# Patient Record
Sex: FEMALE | Race: WHITE | NOT HISPANIC OR LATINO | Employment: FULL TIME | URBAN - METROPOLITAN AREA
[De-identification: names, ages, dates, MRNs, and addresses within clinical notes are randomized per-mention and may not be internally consistent; named-entity substitution may affect disease eponyms.]

---

## 2017-04-28 ENCOUNTER — HOSPITAL ENCOUNTER (OUTPATIENT)
Dept: CARDIOLOGY | Facility: HOSPITAL | Age: 67
Discharge: HOME | End: 2017-04-28
Attending: INTERNAL MEDICINE | Admitting: INTERNAL MEDICINE

## 2021-03-15 ENCOUNTER — OFFICE VISIT (OUTPATIENT)
Dept: CARDIOLOGY | Facility: CLINIC | Age: 71
End: 2021-03-15

## 2021-03-15 DIAGNOSIS — Z79.02 LONG TERM CURRENT USE OF ANTITHROMBOTICS/ANTIPLATELETS: Primary | ICD-10-CM

## 2021-03-15 DIAGNOSIS — E78.2 MIXED HYPERLIPIDEMIA: ICD-10-CM

## 2021-03-15 DIAGNOSIS — I10 ESSENTIAL HYPERTENSION: ICD-10-CM

## 2021-03-15 DIAGNOSIS — E66.01 MORBIDLY OBESE (HCC): ICD-10-CM

## 2021-03-15 DIAGNOSIS — I42.5 CONSTRICTIVE CARDIOMYOPATHY (HCC): ICD-10-CM

## 2021-03-15 PROBLEM — E78.5 HYPERLIPIDEMIA: Status: ACTIVE | Noted: 2018-08-22

## 2021-03-15 PROCEDURE — 93000 ELECTROCARDIOGRAM COMPLETE: CPT | Performed by: NURSE PRACTITIONER

## 2021-03-15 PROCEDURE — 99213 OFFICE O/P EST LOW 20 MIN: CPT | Performed by: NURSE PRACTITIONER

## 2021-03-15 RX ORDER — POTASSIUM CHLORIDE 750 MG/1
10 TABLET, FILM COATED, EXTENDED RELEASE ORAL DAILY
COMMUNITY
End: 2022-04-14 | Stop reason: ALTCHOICE

## 2021-03-15 RX ORDER — CARVEDILOL 3.12 MG/1
TABLET ORAL
COMMUNITY
Start: 2021-01-14

## 2021-03-15 RX ORDER — ALPRAZOLAM 0.5 MG/1
TABLET ORAL AS NEEDED
COMMUNITY

## 2021-03-15 RX ORDER — AMLODIPINE BESYLATE 5 MG/1
5 TABLET ORAL DAILY
Qty: 30 TABLET | Refills: 5 | Status: SHIPPED | OUTPATIENT
Start: 2021-03-15

## 2021-03-15 RX ORDER — FUROSEMIDE 40 MG/1
TABLET ORAL
COMMUNITY
Start: 2021-01-14

## 2021-03-15 RX ORDER — SULINDAC 200 MG/1
TABLET ORAL 2 TIMES DAILY
COMMUNITY
Start: 2021-01-14

## 2021-03-15 RX ORDER — LOSARTAN POTASSIUM 100 MG/1
TABLET ORAL DAILY
COMMUNITY
Start: 2021-03-04

## 2021-03-15 RX ORDER — ATORVASTATIN CALCIUM 20 MG/1
TABLET, FILM COATED ORAL
COMMUNITY
Start: 2021-01-14

## 2021-03-15 NOTE — PROGRESS NOTES
[facility] CARDIOLOGY      REASON FOR FOLLOW-UP:  CDL renewal  Hypertension with hypertensive cardiovascular disease  Dyslipidemia  Nonischemic cardiomyopathy  Improvement in LV systolic function  History of congestive heart failure with reduced ejection fraction        Chief Complaint   Patient presents with   • Cardiomyopathy     2 year f/u-needs CDL license renewed.  No cardiac complaints.   • Hypertension         Dear Brian Wheeler MD        History of Present Illness     It was my pleasure to see Ms. Farrell in the office today.  As you are aware, she is a 70 year-old  female with no known history of occlusive coronary disease.  She does however have a history of nonischemic cardiomyopathy with echocardiogram 8/5/2016 showing moderate-severely reduced LV systolic function and EF 35-40%.  Follow-up echo 5/2/2017 with significant improvement in overall LV systolic function and EF 55-60%, grade 1 diastolic dysfunction-impaired LV relaxation, mild concentric LVH.  She has additional history that includes hypertension with hypertensive cardiovascular disease, dyslipidemia, congestive heart failure.  Patient's last office visit was February 2019.  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    Today, the patient reports that she feels well.  She specifically denies any complaints of chest pains, pressure, tightness or palpitations.  She denies any shortness of breath at rest, dyspnea with exertion, orthopnea or PND.  No dizziness, lightheadedness, lower extremity edema or claudication.  She stated her blood pressure medication is being adjusted for uncontrolled hypertension.  Blood pressure in the office today is elevated at 160/82.  EKG shows normal sinus rhythm with rate of 59.       Assessment:  Nonischemic cardiomyopathy with most recent ejection fraction improved from 35-40% to 55-60%.  History of HFrEF.      Echo 5/17 with normal LV systolic  function  Hypertension  Dyslipidemia.  Antiplatelet therapy.        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  No room to titrate beta-blocker due to heart rate.  Patient on high dose of losartan.  We will add amlodipine 5 mg once daily  Follow-up in 12 months time or sooner if needed        The following portions of the patient's history were reviewed and updated as appropriate: allergies, current medications, past family history, past medical history, past social history, past surgical history and problem list.    REVIEW OF SYSTEMS:    Review of Systems   All other systems reviewed and are negative.      Vitals:    03/15/21 1418   BP: 160/82   Pulse: 60   SpO2: 99%         PHYSICAL EXAM:    General: Alert, cooperative, no distress, appears stated age  Head:  Normocephalic, atraumatic, mucous membranes moist  Eyes:  Conjunctiva/corneas clear, EOM's intact     Neck:  Supple,  no JVD or bruit     Lungs: Clear to auscultation bilaterally, no wheezes rhonchi rales are noted  Chest wall: No tenderness  Musculoskeletal:   Ambulates freely without assistance  Heart::  Regular rate and rhythm, S1 and S2 normal, no murmur, rub or gallop  Abdomen: Soft, non-tender, nondistended, bowel sounds active, no abdominal bruit  Extremities: No cyanosis, clubbing, or edema   Pulses: 2+ and symmetric all extremities  Skin:  No rashes or lesions  Neuro/psych: A&O x3. CN II through XII are grossly intact with appropriate affect        Past Medical History:   Diagnosis Date   • Cancer (CMS/HCC)    • Cardiomyopathy (CMS/HCC)    • CHF (congestive heart failure) (CMS/HCC)    • Hyperlipidemia    • Hypertension        Past Surgical History:   Procedure Laterality Date   • BREAST LUMPECTOMY Right 2001   • LAPAROSCOPIC TUBAL LIGATION           Current Outpatient Medications:   •  ALPRAZolam (XANAX) 0.5 MG tablet, As Needed., Disp: , Rfl:   •  Aspirin  "Buf,CaCarb-MgCarb-MgO, 81 MG tablet, Take 81 mg by mouth Daily., Disp: , Rfl:   •  atorvastatin (LIPITOR) 20 MG tablet, TAKE 1 TABLET DAILY, Disp: , Rfl:   •  carvedilol (COREG) 3.125 MG tablet, TAKE 1 TABLET TWICE A DAY WITH MEALS, Disp: , Rfl:   •  furosemide (LASIX) 40 MG tablet, TAKE 1 TABLET DAILY, Disp: , Rfl:   •  losartan (COZAAR) 100 MG tablet, Daily., Disp: , Rfl:   •  potassium chloride 10 MEQ CR tablet, Take 10 mEq by mouth Daily., Disp: , Rfl:   •  sulindac (CLINORIL) 200 MG tablet, 2 (Two) Times a Day., Disp: , Rfl:   •  amLODIPine (NORVASC) 5 MG tablet, Take 1 tablet by mouth Daily., Disp: 30 tablet, Rfl: 5    No Known Allergies    Family History   Problem Relation Age of Onset   • Hypertension Mother    • Heart disease Mother    • Cancer Father    • Stroke Maternal Grandmother    • Heart attack Maternal Grandfather    • Cancer Paternal Grandmother        Social History     Tobacco Use   • Smoking status: Never Smoker   • Smokeless tobacco: Never Used   Substance Use Topics   • Alcohol use: Not Currently           Current Electrocardiogram:    ECG 12 Lead    Date/Time: 3/15/2021 5:13 PM  Performed by: Alexia Burt APRN  Authorized by: Alexia Burt APRN   Comparison: not compared with previous ECG   Rhythm: sinus bradycardia  BPM: 59                  EMR Dragon/Transcription:   \"Dictated utilizing Dragon dictation\".       "

## 2021-03-18 PROBLEM — E66.01 MORBIDLY OBESE: Status: ACTIVE | Noted: 2021-03-18

## 2021-03-23 VITALS
HEIGHT: 62 IN | HEART RATE: 60 BPM | DIASTOLIC BLOOD PRESSURE: 82 MMHG | OXYGEN SATURATION: 99 % | BODY MASS INDEX: 30.36 KG/M2 | SYSTOLIC BLOOD PRESSURE: 160 MMHG | WEIGHT: 165 LBS

## 2022-04-14 ENCOUNTER — OFFICE VISIT (OUTPATIENT)
Dept: CARDIOLOGY | Facility: CLINIC | Age: 72
End: 2022-04-14

## 2022-04-14 VITALS
HEART RATE: 60 BPM | HEIGHT: 63 IN | WEIGHT: 165 LBS | SYSTOLIC BLOOD PRESSURE: 148 MMHG | OXYGEN SATURATION: 98 % | DIASTOLIC BLOOD PRESSURE: 72 MMHG | BODY MASS INDEX: 29.23 KG/M2

## 2022-04-14 DIAGNOSIS — I42.8 NON-ISCHEMIC CARDIOMYOPATHY: ICD-10-CM

## 2022-04-14 DIAGNOSIS — Z79.02 LONG TERM CURRENT USE OF ANTITHROMBOTICS/ANTIPLATELETS: ICD-10-CM

## 2022-04-14 DIAGNOSIS — I10 ESSENTIAL HYPERTENSION: Primary | ICD-10-CM

## 2022-04-14 DIAGNOSIS — E78.2 MIXED HYPERLIPIDEMIA: ICD-10-CM

## 2022-04-14 PROCEDURE — 99214 OFFICE O/P EST MOD 30 MIN: CPT | Performed by: INTERNAL MEDICINE

## 2022-04-14 PROCEDURE — 93000 ELECTROCARDIOGRAM COMPLETE: CPT | Performed by: INTERNAL MEDICINE

## 2022-04-14 RX ORDER — POTASSIUM CHLORIDE 1500 MG/1
TABLET, EXTENDED RELEASE ORAL
COMMUNITY
Start: 2022-01-11

## 2022-04-14 NOTE — PROGRESS NOTES
Cardiology Office Visit      Encounter Date:  04/14/2022    Patient ID:   Lakshmi Farrell is a 71 y.o. female.    Reason For Followup:  Nonischemic cardiomyopathy    Brief Clinical History:  Dear Dr. Blevins, Bud CHAN MD    I had the pleasure of seeing Lakshmi Farrell today. As you are well aware, this is a 71 y.o. female with no established history of ischemic heart disease.  She does have a history of nonischemic cardiomyopathy/heart failure with recovered ejection fraction.  She has additional history that includes hypertension, hyperlipidemia, and antiplatelet therapy.  She presents today for follow-up on the above conditions.    Interval History:  She denies any chest pain pressure heaviness or tightness.  She denies any shortness of breath.  She denies any PND orthopnea.  She denies any syncope or near syncope.  She reports feeling well from a cardiac perspective.    Is some she will recall, she was diagnosed with nonischemic cardiomyopathy several years ago.  Fortunately, her left ventricular systolic function has returned to normal as of her echocardiogram in May 2017.    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  They are requiring cardiac evaluation.    I was able to obtain her most recent laboratory work performed in April 2022.  Hemoglobin hematocrit were 12.8 and 38 respectively.  BUN and creatinine were 31 and 1.3 respectively with a GFR of 43.  Total cholesterol 160, triglycerides 103, HDL 51, LDL 90.    Assessment & Plan    Impressions:  Heart failure with recovered ejection fraction secondary to nonischemic cardiomyopathy  Hypertension  Hypertensive cardiovascular disease  Hyperlipidemia  Antiplatelet therapy    Recommendations:  2D echocardiogram  Follow-up in 1 year        Diagnoses and all orders for this visit:    1. Essential hypertension (Primary)  -     Adult Transthoracic Echo Complete W/ Cont if Necessary Per Protocol; Future  -      "ECG 12 Lead    2. Mixed hyperlipidemia  -     Adult Transthoracic Echo Complete W/ Cont if Necessary Per Protocol; Future  -     ECG 12 Lead    3. Long term current use of antithrombotics/antiplatelets  -     Adult Transthoracic Echo Complete W/ Cont if Necessary Per Protocol; Future  -     ECG 12 Lead    4. Non-ischemic cardiomyopathy (HCC)  -     Adult Transthoracic Echo Complete W/ Cont if Necessary Per Protocol; Future  -     ECG 12 Lead          Objective:    Vitals:  Vitals:    04/14/22 1518   BP: 148/72   Pulse: 60   SpO2: 98%   Weight: 74.8 kg (165 lb)   Height: 160 cm (63\")     Body mass index is 29.23 kg/m².      Physical Exam:    General: Alert, cooperative, no distress, appears stated age  Head:  Normocephalic, atraumatic, mucous membranes moist  Eyes:  Conjunctiva/corneas clear, EOM's intact     Neck:  Supple,  no bruit    Lungs: Clear to auscultation bilaterally, no wheezes rhonchi rales are noted  Chest wall: No tenderness  Heart::  Regular rate and rhythm, S1 and S2 normal, 1/6 holosystolic murmur.  No rub or gallop  Abdomen: Soft, non-tender, nondistended bowel sounds active  Extremities: No cyanosis, clubbing, or edema  Pulses: 2+ and symmetric all extremities  Skin:  No rashes or lesions  Neuro/psych: A&O x3. CN II through XII are grossly intact with appropriate affect      Allergies:  No Known Allergies    Medication Review:     Current Outpatient Medications:   •  ALPRAZolam (XANAX) 0.5 MG tablet, As Needed., Disp: , Rfl:   •  amLODIPine (NORVASC) 5 MG tablet, Take 1 tablet by mouth Daily., Disp: 30 tablet, Rfl: 5  •  Aspirin Buf,CaCarb-MgCarb-MgO, 81 MG tablet, Take 81 mg by mouth Daily., Disp: , Rfl:   •  atorvastatin (LIPITOR) 20 MG tablet, TAKE 1 TABLET DAILY, Disp: , Rfl:   •  carvedilol (COREG) 3.125 MG tablet, TAKE 1 TABLET TWICE A DAY WITH MEALS, Disp: , Rfl:   •  furosemide (LASIX) 40 MG tablet, TAKE 1 TABLET DAILY, Disp: , Rfl:   •  KLOR-CON 20 MEQ CR tablet, , Disp: , Rfl:   •  " losartan (COZAAR) 100 MG tablet, Daily., Disp: , Rfl:   •  sulindac (CLINORIL) 200 MG tablet, 2 (Two) Times a Day., Disp: , Rfl:     Family History:  Family History   Problem Relation Age of Onset   • Hypertension Mother    • Heart disease Mother    • Cancer Father    • Stroke Maternal Grandmother    • Heart attack Maternal Grandfather    • Cancer Paternal Grandmother        Past Medical History:  Past Medical History:   Diagnosis Date   • Cancer (HCC)    • Cardiomyopathy (HCC)    • CHF (congestive heart failure) (HCC)    • Hyperlipidemia    • Hypertension        Past Surgical History:  Past Surgical History:   Procedure Laterality Date   • BREAST LUMPECTOMY Right 2001   • LAPAROSCOPIC TUBAL LIGATION         Social History:  Social History     Socioeconomic History   • Marital status:    Tobacco Use   • Smoking status: Never Smoker   • Smokeless tobacco: Never Used   Vaping Use   • Vaping Use: Never used   Substance and Sexual Activity   • Alcohol use: Not Currently   • Drug use: Never   • Sexual activity: Defer       Review of Systems:  The following systems were reviewed as they relate to the cardiovascular system: Constitutional, Eyes, ENT, Cardiovascular, Respiratory, Gastrointestinal, Integumentary, Neurological, Psychiatric, Hematologic, Endocrine, Musculoskeletal, and Genitourinary. The pertinent cardiovascular findings are reported above with all other cardiovascular points within those systems being negative.    Diagnostic Study Review:     Current Electrocardiogram:    ECG 12 Lead    Date/Time: 4/14/2022 4:22 PM  Performed by: Guillaume Pulliam DO  Authorized by: Guillaume Pulliam DO   Comparison: not compared with previous ECG   Previous ECG: no previous ECG available  Comments: Sinus bradycardia with a ventricular rate of 54 bpm.  Nonspecific repolarization changes.  Normal QT and QTc intervals.  Normal QRS axis.            Laboratory Data:  Lab Results   Component Value Date     BUN 21 (H) 10/13/2020    CREATININE 0.9 10/13/2020    BCR 24.0 (H) 10/13/2020    K 4.5 10/13/2020    CO2 31 (H) 10/13/2020    CALCIUM 9.5 10/13/2020    ALBUMIN 4.2 10/13/2020    LABIL2 1.1 10/13/2020    AST 19 10/13/2020    ALT 11 (L) 10/13/2020     Lab Results   Component Value Date    CALCIUM 9.5 10/13/2020     10/13/2020    K 4.5 10/13/2020    CO2 31 (H) 10/13/2020     10/13/2020    BUN 21 (H) 10/13/2020    CREATININE 0.9 10/13/2020    BCR 24.0 (H) 10/13/2020     No results found for: WBC, HGB, HCT, MCV, PLT  Lab Results   Component Value Date    CHLPL 193 10/16/2019    TRIG 169 (H) 10/16/2019    HDL 44 (L) 10/16/2019     (H) 10/16/2019     No results found for: HGBA1C  No results found for: INR, PROTIME    Most Recent Echo:       Most Recent Stress Test:       Most Recent Cardiac Catheterization:   No results found for this or any previous visit.       NOTE: The following portions of the patient's note were reviewed, confirmed and/or updated this visit as appropriate: History of present illness/Interval history, physical examination, assessment & plan, allergies, current medications, past family history, past medical history, past social history, past surgical history and problem list.

## 2022-04-28 ENCOUNTER — HOSPITAL ENCOUNTER (OUTPATIENT)
Dept: CARDIOLOGY | Facility: HOSPITAL | Age: 72
Discharge: HOME | End: 2022-04-28
Admitting: INTERNAL MEDICINE

## 2022-04-28 VITALS
DIASTOLIC BLOOD PRESSURE: 72 MMHG | BODY MASS INDEX: 29.23 KG/M2 | SYSTOLIC BLOOD PRESSURE: 152 MMHG | HEIGHT: 63 IN | WEIGHT: 165 LBS | HEART RATE: 52 BPM

## 2022-04-28 DIAGNOSIS — E78.2 MIXED HYPERLIPIDEMIA: ICD-10-CM

## 2022-04-28 DIAGNOSIS — I42.8 NON-ISCHEMIC CARDIOMYOPATHY: ICD-10-CM

## 2022-04-28 DIAGNOSIS — Z79.02 LONG TERM CURRENT USE OF ANTITHROMBOTICS/ANTIPLATELETS: ICD-10-CM

## 2022-04-28 DIAGNOSIS — I10 ESSENTIAL HYPERTENSION: ICD-10-CM

## 2022-04-28 PROCEDURE — 93306 TTE W/DOPPLER COMPLETE: CPT

## 2022-04-29 LAB
ASCENDING AORTA: 3.1 CM
BH CV ECHO MEAS - ACS: 1.94 CM
BH CV ECHO MEAS - AO MAX PG: 7.6 MMHG
BH CV ECHO MEAS - AO MEAN PG: 4.1 MMHG
BH CV ECHO MEAS - AO ROOT DIAM: 3.2 CM
BH CV ECHO MEAS - AO V2 MAX: 137.5 CM/SEC
BH CV ECHO MEAS - AO V2 VTI: 35.7 CM
BH CV ECHO MEAS - AVA(I,D): 2.42 CM2
BH CV ECHO MEAS - EDV(CUBED): 74.4 ML
BH CV ECHO MEAS - EDV(MOD-SP2): 99.7 ML
BH CV ECHO MEAS - EDV(MOD-SP4): 81.6 ML
BH CV ECHO MEAS - EF(MOD-BP): 51 %
BH CV ECHO MEAS - EF(MOD-SP2): 52.2 %
BH CV ECHO MEAS - EF(MOD-SP4): 52.6 %
BH CV ECHO MEAS - ESV(CUBED): 27.1 ML
BH CV ECHO MEAS - ESV(MOD-SP2): 47.7 ML
BH CV ECHO MEAS - ESV(MOD-SP4): 38.7 ML
BH CV ECHO MEAS - FS: 28.6 %
BH CV ECHO MEAS - IVS/LVPW: 1.2 CM
BH CV ECHO MEAS - IVSD: 1.13 CM
BH CV ECHO MEAS - LA DIMENSION(2D): 3.8 CM
BH CV ECHO MEAS - LA DIMENSION: 4 CM
BH CV ECHO MEAS - LAT PEAK E' VEL: 6 CM/SEC
BH CV ECHO MEAS - LV DIASTOLIC VOL/BSA (35-75): 45.8 CM2
BH CV ECHO MEAS - LV MASS(C)D: 144.5 GRAMS
BH CV ECHO MEAS - LV MAX PG: 3.1 MMHG
BH CV ECHO MEAS - LV MEAN PG: 1.78 MMHG
BH CV ECHO MEAS - LV SYSTOLIC VOL/BSA (12-30): 21.7 CM2
BH CV ECHO MEAS - LV V1 MAX: 88.1 CM/SEC
BH CV ECHO MEAS - LV V1 VTI: 24.4 CM
BH CV ECHO MEAS - LVIDD: 4.2 CM
BH CV ECHO MEAS - LVIDS: 3 CM
BH CV ECHO MEAS - LVOT AREA: 3.5 CM2
BH CV ECHO MEAS - LVOT DIAM: 2.12 CM
BH CV ECHO MEAS - LVPWD: 0.94 CM
BH CV ECHO MEAS - MED PEAK E' VEL: 4 CM/SEC
BH CV ECHO MEAS - MV A MAX VEL: 108.7 CM/SEC
BH CV ECHO MEAS - MV DEC SLOPE: 401 CM/SEC2
BH CV ECHO MEAS - MV DEC TIME: 0.22 MSEC
BH CV ECHO MEAS - MV E MAX VEL: 87.7 CM/SEC
BH CV ECHO MEAS - MV E/A: 0.81
BH CV ECHO MEAS - MV MAX PG: 4.9 MMHG
BH CV ECHO MEAS - MV MEAN PG: 1.53 MMHG
BH CV ECHO MEAS - MV P1/2T: 59 MSEC
BH CV ECHO MEAS - MV V2 VTI: 31.3 CM
BH CV ECHO MEAS - MVA(P1/2T): 3.7 CM2
BH CV ECHO MEAS - MVA(VTI): 2.8 CM2
BH CV ECHO MEAS - PA ACC SLOPE: 374 CM/SEC2
BH CV ECHO MEAS - PA ACC TIME: 0.15 SEC
BH CV ECHO MEAS - PA PR(ACCEL): 11.3 MMHG
BH CV ECHO MEAS - PA V2 MAX: 73.3 CM/SEC
BH CV ECHO MEAS - PAPD(PI EDV): 10 MMHG
BH CV ECHO MEAS - PI END-D VEL: 64.7 CM/SEC
BH CV ECHO MEAS - PULM A REVS DUR: 0.12 SEC
BH CV ECHO MEAS - PULM A REVS VEL: 21.9 CM/SEC
BH CV ECHO MEAS - PULM DIAS VEL: 33.6 CM/SEC
BH CV ECHO MEAS - PULM S/D: 1.99
BH CV ECHO MEAS - PULM SYS VEL: 66.8 CM/SEC
BH CV ECHO MEAS - QP/QS: 1.16
BH CV ECHO MEAS - RAP SYSTOLE: 8 MMHG
BH CV ECHO MEAS - RV MAX PG: 1.46 MMHG
BH CV ECHO MEAS - RV V1 MAX: 60.3 CM/SEC
BH CV ECHO MEAS - RV V1 VTI: 16 CM
BH CV ECHO MEAS - RVOT DIAM: 2.8 CM
BH CV ECHO MEAS - RVSP: 34 MMHG
BH CV ECHO MEAS - SI(MOD-SP2): 29.2 ML/M2
BH CV ECHO MEAS - SI(MOD-SP4): 24.1 ML/M2
BH CV ECHO MEAS - SV(LVOT): 86.4 ML
BH CV ECHO MEAS - SV(MOD-SP2): 52 ML
BH CV ECHO MEAS - SV(MOD-SP4): 42.9 ML
BH CV ECHO MEAS - SV(RVOT): 100.5 ML
BH CV ECHO MEAS - TAPSE (>1.6): 2.1 CM
BH CV ECHO MEAS - TR MAX PG: 26 MMHG
BH CV ECHO MEAS - TR MAX VEL: 254.8 CM/SEC
BH CV ECHO MEASUREMENTS AVERAGE E/E' RATIO: 17.54
BH CV VAS BP LEFT ARM: NORMAL MMHG
BH CV XLRA - RV BASE: 3.2 CM
BH CV XLRA - RV MID: 2.7 CM
BH CV XLRA - TDI S': 13 CM/SEC
IVRT: 106 MSEC
LEFT ATRIUM VOLUME INDEX: 35 ML/M2
LEFT ATRIUM VOLUME: 63 CM3
MAXIMAL PREDICTED HEART RATE: 147 BPM
PV VALVE AREA: 5.2 CM2
STRESS TARGET HR: 125 BPM

## 2022-04-29 PROCEDURE — 93306 TTE W/DOPPLER COMPLETE: CPT | Performed by: INTERNAL MEDICINE

## 2022-08-01 ENCOUNTER — LAB (OUTPATIENT)
Dept: LAB | Facility: HOSPITAL | Age: 72
End: 2022-08-01

## 2022-08-01 ENCOUNTER — TRANSCRIBE ORDERS (OUTPATIENT)
Dept: ADMINISTRATIVE | Facility: HOSPITAL | Age: 72
End: 2022-08-01

## 2022-08-01 DIAGNOSIS — R05.9 COUGH: ICD-10-CM

## 2022-08-01 DIAGNOSIS — R09.89 CHEST CONGESTION: ICD-10-CM

## 2022-08-01 DIAGNOSIS — R09.81 NASAL CONGESTION: ICD-10-CM

## 2022-08-01 DIAGNOSIS — R05.9 COUGH: Primary | ICD-10-CM

## 2022-08-01 DIAGNOSIS — J02.9 SORE THROAT: ICD-10-CM

## 2022-08-01 LAB — SARS-COV-2 ORF1AB RESP QL NAA+PROBE: DETECTED

## 2022-08-01 PROCEDURE — U0004 COV-19 TEST NON-CDC HGH THRU: HCPCS

## 2022-08-01 PROCEDURE — U0005 INFEC AGEN DETEC AMPLI PROBE: HCPCS

## 2022-08-01 PROCEDURE — C9803 HOPD COVID-19 SPEC COLLECT: HCPCS

## 2023-04-25 ENCOUNTER — OFFICE VISIT (OUTPATIENT)
Dept: CARDIOLOGY | Facility: CLINIC | Age: 73
End: 2023-04-25
Payer: MEDICARE

## 2023-04-25 VITALS
BODY MASS INDEX: 28.7 KG/M2 | SYSTOLIC BLOOD PRESSURE: 130 MMHG | OXYGEN SATURATION: 96 % | WEIGHT: 162 LBS | DIASTOLIC BLOOD PRESSURE: 68 MMHG | HEART RATE: 74 BPM | HEIGHT: 63 IN

## 2023-04-25 DIAGNOSIS — I10 ESSENTIAL HYPERTENSION: Primary | ICD-10-CM

## 2023-04-25 DIAGNOSIS — Z79.02 LONG TERM CURRENT USE OF ANTITHROMBOTICS/ANTIPLATELETS: ICD-10-CM

## 2023-04-25 DIAGNOSIS — I42.8 NICM (NONISCHEMIC CARDIOMYOPATHY): ICD-10-CM

## 2023-04-25 DIAGNOSIS — E78.2 MIXED HYPERLIPIDEMIA: ICD-10-CM

## 2023-04-25 RX ORDER — AMLODIPINE BESYLATE 5 MG/1
5 TABLET ORAL DAILY
Qty: 90 TABLET | Refills: 3 | Status: SHIPPED | OUTPATIENT
Start: 2023-04-25

## 2023-04-25 RX ORDER — ESTRADIOL 10 UG/1
INSERT VAGINAL
COMMUNITY
Start: 2023-04-07

## 2023-04-25 RX ORDER — POTASSIUM CHLORIDE 20 MEQ/1
20 TABLET, EXTENDED RELEASE ORAL DAILY
Qty: 90 TABLET | Refills: 3 | Status: SHIPPED | OUTPATIENT
Start: 2023-04-25

## 2023-04-25 RX ORDER — CARVEDILOL 3.12 MG/1
3.12 TABLET ORAL 2 TIMES DAILY WITH MEALS
Qty: 180 TABLET | Refills: 3 | Status: SHIPPED | OUTPATIENT
Start: 2023-04-25

## 2023-04-25 RX ORDER — ATORVASTATIN CALCIUM 20 MG/1
20 TABLET, FILM COATED ORAL DAILY
Qty: 90 TABLET | Refills: 3 | Status: SHIPPED | OUTPATIENT
Start: 2023-04-25

## 2023-04-25 NOTE — LETTER
April 26, 2023     Bud Blevins MD  [address]  [facility] [address]  [place] [address]    Patient: Lakshmi Farrell   YOB: 1950   Date of Visit: 4/25/2023       Dear Dr. Felicity MD:    Thank you for referring Lakshmi Farrell to me for evaluation. Below are the relevant portions of my assessment and plan of care.    If you have questions, please do not hesitate to call me. I look forward to following Lakshmi along with you.         Sincerely,        Guillaume Pulliam DO        CC: No Recipients    Guillaume Pulliam DO  04/26/23 1704  Signed  Cardiology Office Visit      Encounter Date:  04/25/2023    Patient ID:   Lakshmi Farrell is a 72 y.o. female.    Reason For Followup:  Nonischemic cardiomyopathy    Brief Clinical History:  Dear Bud Clinton MD    I had the pleasure of seeing Lakshmi Farrell today. As you are well aware, this is a 72 y.o. female with no established history of ischemic heart disease.  She does have a history of nonischemic cardiomyopathy/heart failure with recovered ejection fraction.  She has additional history that includes hypertension, hyperlipidemia, and antiplatelet therapy.  She presents today for follow-up on the above conditions.    Interval History:  She denies any chest pain pressure heaviness or tightness.  She denies any shortness of breath.  She denies any PND orthopnea.  She denies any syncope or near syncope.  She reports feeling well from a cardiac perspective.    Is some she will recall, she was diagnosed with nonischemic cardiomyopathy several years ago.  Fortunately, her left ventricular systolic function has returned to normal.  Her most recent echocardiogram was performed in April 2022.  Low normal left ventricular ejection fraction of 50 to 55% was noted.  Grade 1 diastolic dysfunction.    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    Assessment &  "Plan    Impressions:  Heart failure with recovered ejection fraction secondary to nonischemic cardiomyopathy  Hypertension  Hypertensive cardiovascular disease  Hyperlipidemia  Antiplatelet therapy    Recommendations:  Continuation of her current cardiovascular regimen at the present time.     This includes antihypertensives, statin, and diuretic therapy.  Routine surveillance laboratory testing  Follow-up in 1 years time sooner should there be difficulties        Diagnoses and all orders for this visit:    1. Essential hypertension (Primary)  -     CBC & Differential; Future  -     Comprehensive Metabolic Panel; Future  -     Lipid Panel; Future  -     ECG 12 Lead    2. NICM (nonischemic cardiomyopathy)  -     CBC & Differential; Future  -     Comprehensive Metabolic Panel; Future  -     Lipid Panel; Future  -     ECG 12 Lead    3. Long term current use of antithrombotics/antiplatelets  -     CBC & Differential; Future  -     Comprehensive Metabolic Panel; Future  -     Lipid Panel; Future  -     ECG 12 Lead    4. Mixed hyperlipidemia  -     CBC & Differential; Future  -     Comprehensive Metabolic Panel; Future  -     Lipid Panel; Future  -     ECG 12 Lead    Other orders  -     potassium chloride (KLOR-CON) 20 MEQ CR tablet; Take 1 tablet by mouth Daily.  Dispense: 90 tablet; Refill: 3  -     carvedilol (COREG) 3.125 MG tablet; Take 1 tablet by mouth 2 (Two) Times a Day With Meals.  Dispense: 180 tablet; Refill: 3  -     amLODIPine (NORVASC) 5 MG tablet; Take 1 tablet by mouth Daily.  Dispense: 90 tablet; Refill: 3  -     atorvastatin (LIPITOR) 20 MG tablet; Take 1 tablet by mouth Daily.  Dispense: 90 tablet; Refill: 3          Objective:    Vitals:  Vitals:    04/25/23 1531   BP: 130/68   BP Location: Left arm   Patient Position: Sitting   Cuff Size: Large Adult   Pulse: 74   SpO2: 96%   Weight: 73.5 kg (162 lb)   Height: 160 cm (63\")     Body mass index is 28.7 kg/m².      Physical Exam:    General: Alert, " cooperative, no distress, appears stated age  Head:  Normocephalic, atraumatic, mucous membranes moist  Eyes:  Conjunctiva/corneas clear, EOM's intact     Neck:  Supple,  no bruit    Lungs: Clear to auscultation bilaterally, no wheezes rhonchi rales are noted  Chest wall: No tenderness  Heart::  Regular rate and rhythm, S1 and S2 normal, 1/6 holosystolic murmur.  No rub or gallop  Abdomen: Soft, non-tender, nondistended bowel sounds active  Extremities: No cyanosis, clubbing, or edema  Pulses: 2+ and symmetric all extremities  Skin:  No rashes or lesions  Neuro/psych: A&O x3. CN II through XII are grossly intact with appropriate affect      Allergies:  No Known Allergies    Medication Review:     Current Outpatient Medications:   •  ALPRAZolam (XANAX) 0.5 MG tablet, As Needed., Disp: , Rfl:   •  amLODIPine (NORVASC) 5 MG tablet, Take 1 tablet by mouth Daily., Disp: 90 tablet, Rfl: 3  •  Aspirin Buf,CaCarb-MgCarb-MgO, 81 MG tablet, Take 81 mg by mouth Daily., Disp: , Rfl:   •  atorvastatin (LIPITOR) 20 MG tablet, Take 1 tablet by mouth Daily., Disp: 90 tablet, Rfl: 3  •  carvedilol (COREG) 3.125 MG tablet, Take 1 tablet by mouth 2 (Two) Times a Day With Meals., Disp: 180 tablet, Rfl: 3  •  estradiol (VAGIFEM) 10 MCG tablet vaginal tablet, , Disp: , Rfl:   •  furosemide (LASIX) 40 MG tablet, TAKE 1 TABLET DAILY, Disp: , Rfl:   •  losartan (COZAAR) 100 MG tablet, Daily., Disp: , Rfl:   •  potassium chloride (KLOR-CON) 20 MEQ CR tablet, Take 1 tablet by mouth Daily., Disp: 90 tablet, Rfl: 3  •  sulindac (CLINORIL) 200 MG tablet, 2 (Two) Times a Day., Disp: , Rfl:     Family History:  Family History   Problem Relation Age of Onset   • Hypertension Mother    • Heart disease Mother    • Cancer Father    • Stroke Maternal Grandmother    • Heart attack Maternal Grandfather    • Cancer Paternal Grandmother        Past Medical History:  Past Medical History:   Diagnosis Date   • Cancer    • Cardiomyopathy    • CHF (congestive  heart failure)    • Hyperlipidemia    • Hypertension        Past Surgical History:  Past Surgical History:   Procedure Laterality Date   • BREAST LUMPECTOMY Right 2001   • LAPAROSCOPIC TUBAL LIGATION         Social History:  Social History     Socioeconomic History   • Marital status:    Tobacco Use   • Smoking status: Never   • Smokeless tobacco: Never   Vaping Use   • Vaping Use: Never used   Substance and Sexual Activity   • Alcohol use: Not Currently   • Drug use: Never   • Sexual activity: Defer       Review of Systems:  The following systems were reviewed as they relate to the cardiovascular system: Constitutional, Eyes, ENT, Cardiovascular, Respiratory, Gastrointestinal, Integumentary, Neurological, Psychiatric, Hematologic, Endocrine, Musculoskeletal, and Genitourinary. The pertinent cardiovascular findings are reported above with all other cardiovascular points within those systems being negative.    Diagnostic Study Review:     Current Electrocardiogram:    ECG 12 Lead    Date/Time: 4/26/2023 5:03 PM  Performed by: Guillaume Pulliam DO  Authorized by: Guillaume Pulliam DO   Comparison: not compared with previous ECG   Previous ECG: no previous ECG available  Comments: Normal sinus rhythm with a ventricular rate of 68 bpm.  Normal QT and QTc intervals.  Normal QRS axis.            Laboratory Data:  Lab Results   Component Value Date    BUN 21 (H) 10/13/2020    CREATININE 0.9 10/13/2020    BCR 24.0 (H) 10/13/2020    K 4.5 10/13/2020    CO2 31 (H) 10/13/2020    CALCIUM 9.5 10/13/2020    ALBUMIN 4.2 10/13/2020    LABIL2 1.1 10/13/2020    AST 19 10/13/2020    ALT 11 (L) 10/13/2020     Lab Results   Component Value Date    CALCIUM 9.5 10/13/2020     10/13/2020    K 4.5 10/13/2020    CO2 31 (H) 10/13/2020     10/13/2020    BUN 21 (H) 10/13/2020    CREATININE 0.9 10/13/2020    BCR 24.0 (H) 10/13/2020     No results found for: WBC, HGB, HCT, MCV, PLT  Lab Results   Component  Value Date    CHLPL 193 10/16/2019    TRIG 169 (H) 10/16/2019    HDL 44 (L) 10/16/2019     (H) 10/16/2019     No results found for: HGBA1C  No results found for: INR, PROTIME    Most Recent Echo:  Results for orders placed during the hospital encounter of 04/28/22    Adult Transthoracic Echo Complete W/ Cont if Necessary Per Protocol    Interpretation Summary  · Estimated left ventricular EF was in agreement with the calculated left ventricular EF. Left ventricular ejection fraction appears to be 51 - 55%. Left ventricular systolic function is low normal.  · Left ventricular diastolic function is consistent with (grade I) impaired relaxation.  · Estimated right ventricular systolic pressure from tricuspid regurgitation is normal (<35 mmHg).       Most Recent Stress Test:       Most Recent Cardiac Catheterization:   No results found for this or any previous visit.       NOTE: The following portions of the patient's note were reviewed, confirmed and/or updated this visit as appropriate: History of present illness/Interval history, physical examination, assessment & plan, allergies, current medications, past family history, past medical history, past social history, past surgical history and problem list.

## 2023-04-25 NOTE — PROGRESS NOTES
Cardiology Office Visit      Encounter Date:  04/25/2023    Patient ID:   Lakshmi Farrell is a 72 y.o. female.    Reason For Followup:  Nonischemic cardiomyopathy    Brief Clinical History:  Dear Dr. Blevins, Bud CHAN MD    I had the pleasure of seeing Lakshmi Farrell today. As you are well aware, this is a 72 y.o. female with no established history of ischemic heart disease.  She does have a history of nonischemic cardiomyopathy/heart failure with recovered ejection fraction.  She has additional history that includes hypertension, hyperlipidemia, and antiplatelet therapy.  She presents today for follow-up on the above conditions.    Interval History:  She denies any chest pain pressure heaviness or tightness.  She denies any shortness of breath.  She denies any PND orthopnea.  She denies any syncope or near syncope.  She reports feeling well from a cardiac perspective.    Is some she will recall, she was diagnosed with nonischemic cardiomyopathy several years ago.  Fortunately, her left ventricular systolic function has returned to normal.  Her most recent echocardiogram was performed in April 2022.  Low normal left ventricular ejection fraction of 50 to 55% was noted.  Grade 1 diastolic dysfunction.    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    Assessment & Plan    Impressions:  Heart failure with recovered ejection fraction secondary to nonischemic cardiomyopathy  Hypertension  Hypertensive cardiovascular disease  Hyperlipidemia  Antiplatelet therapy    Recommendations:  Continuation of her current cardiovascular regimen at the present time.     This includes antihypertensives, statin, and diuretic therapy.  Routine surveillance laboratory testing  Follow-up in 1 years time sooner should there be difficulties        Diagnoses and all orders for this visit:    1. Essential hypertension (Primary)  -     CBC & Differential; Future  -      "Comprehensive Metabolic Panel; Future  -     Lipid Panel; Future  -     ECG 12 Lead    2. NICM (nonischemic cardiomyopathy)  -     CBC & Differential; Future  -     Comprehensive Metabolic Panel; Future  -     Lipid Panel; Future  -     ECG 12 Lead    3. Long term current use of antithrombotics/antiplatelets  -     CBC & Differential; Future  -     Comprehensive Metabolic Panel; Future  -     Lipid Panel; Future  -     ECG 12 Lead    4. Mixed hyperlipidemia  -     CBC & Differential; Future  -     Comprehensive Metabolic Panel; Future  -     Lipid Panel; Future  -     ECG 12 Lead    Other orders  -     potassium chloride (KLOR-CON) 20 MEQ CR tablet; Take 1 tablet by mouth Daily.  Dispense: 90 tablet; Refill: 3  -     carvedilol (COREG) 3.125 MG tablet; Take 1 tablet by mouth 2 (Two) Times a Day With Meals.  Dispense: 180 tablet; Refill: 3  -     amLODIPine (NORVASC) 5 MG tablet; Take 1 tablet by mouth Daily.  Dispense: 90 tablet; Refill: 3  -     atorvastatin (LIPITOR) 20 MG tablet; Take 1 tablet by mouth Daily.  Dispense: 90 tablet; Refill: 3          Objective:    Vitals:  Vitals:    04/25/23 1531   BP: 130/68   BP Location: Left arm   Patient Position: Sitting   Cuff Size: Large Adult   Pulse: 74   SpO2: 96%   Weight: 73.5 kg (162 lb)   Height: 160 cm (63\")     Body mass index is 28.7 kg/m².      Physical Exam:    General: Alert, cooperative, no distress, appears stated age  Head:  Normocephalic, atraumatic, mucous membranes moist  Eyes:  Conjunctiva/corneas clear, EOM's intact     Neck:  Supple,  no bruit    Lungs: Clear to auscultation bilaterally, no wheezes rhonchi rales are noted  Chest wall: No tenderness  Heart::  Regular rate and rhythm, S1 and S2 normal, 1/6 holosystolic murmur.  No rub or gallop  Abdomen: Soft, non-tender, nondistended bowel sounds active  Extremities: No cyanosis, clubbing, or edema  Pulses: 2+ and symmetric all extremities  Skin:  No rashes or lesions  Neuro/psych: A&O x3. CN II " through XII are grossly intact with appropriate affect      Allergies:  No Known Allergies    Medication Review:     Current Outpatient Medications:   •  ALPRAZolam (XANAX) 0.5 MG tablet, As Needed., Disp: , Rfl:   •  amLODIPine (NORVASC) 5 MG tablet, Take 1 tablet by mouth Daily., Disp: 90 tablet, Rfl: 3  •  Aspirin Buf,CaCarb-MgCarb-MgO, 81 MG tablet, Take 81 mg by mouth Daily., Disp: , Rfl:   •  atorvastatin (LIPITOR) 20 MG tablet, Take 1 tablet by mouth Daily., Disp: 90 tablet, Rfl: 3  •  carvedilol (COREG) 3.125 MG tablet, Take 1 tablet by mouth 2 (Two) Times a Day With Meals., Disp: 180 tablet, Rfl: 3  •  estradiol (VAGIFEM) 10 MCG tablet vaginal tablet, , Disp: , Rfl:   •  furosemide (LASIX) 40 MG tablet, TAKE 1 TABLET DAILY, Disp: , Rfl:   •  losartan (COZAAR) 100 MG tablet, Daily., Disp: , Rfl:   •  potassium chloride (KLOR-CON) 20 MEQ CR tablet, Take 1 tablet by mouth Daily., Disp: 90 tablet, Rfl: 3  •  sulindac (CLINORIL) 200 MG tablet, 2 (Two) Times a Day., Disp: , Rfl:     Family History:  Family History   Problem Relation Age of Onset   • Hypertension Mother    • Heart disease Mother    • Cancer Father    • Stroke Maternal Grandmother    • Heart attack Maternal Grandfather    • Cancer Paternal Grandmother        Past Medical History:  Past Medical History:   Diagnosis Date   • Cancer    • Cardiomyopathy    • CHF (congestive heart failure)    • Hyperlipidemia    • Hypertension        Past Surgical History:  Past Surgical History:   Procedure Laterality Date   • BREAST LUMPECTOMY Right 2001   • LAPAROSCOPIC TUBAL LIGATION         Social History:  Social History     Socioeconomic History   • Marital status:    Tobacco Use   • Smoking status: Never   • Smokeless tobacco: Never   Vaping Use   • Vaping Use: Never used   Substance and Sexual Activity   • Alcohol use: Not Currently   • Drug use: Never   • Sexual activity: Defer       Review of Systems:  The following systems were reviewed as they relate  to the cardiovascular system: Constitutional, Eyes, ENT, Cardiovascular, Respiratory, Gastrointestinal, Integumentary, Neurological, Psychiatric, Hematologic, Endocrine, Musculoskeletal, and Genitourinary. The pertinent cardiovascular findings are reported above with all other cardiovascular points within those systems being negative.    Diagnostic Study Review:     Current Electrocardiogram:    ECG 12 Lead    Date/Time: 4/25/2023 5:03 PM  Performed by: Guillaume Pulliam DO  Authorized by: Guillaume Pulliam DO   Comparison: not compared with previous ECG   Previous ECG: no previous ECG available  Comments: Normal sinus rhythm with a ventricular rate of 68 bpm.  Normal QT and QTc intervals.  Normal QRS axis.            Laboratory Data:  Lab Results   Component Value Date    BUN 21 (H) 10/13/2020    CREATININE 0.9 10/13/2020    BCR 24.0 (H) 10/13/2020    K 4.5 10/13/2020    CO2 31 (H) 10/13/2020    CALCIUM 9.5 10/13/2020    ALBUMIN 4.2 10/13/2020    LABIL2 1.1 10/13/2020    AST 19 10/13/2020    ALT 11 (L) 10/13/2020     Lab Results   Component Value Date    CALCIUM 9.5 10/13/2020     10/13/2020    K 4.5 10/13/2020    CO2 31 (H) 10/13/2020     10/13/2020    BUN 21 (H) 10/13/2020    CREATININE 0.9 10/13/2020    BCR 24.0 (H) 10/13/2020     No results found for: WBC, HGB, HCT, MCV, PLT  Lab Results   Component Value Date    CHLPL 193 10/16/2019    TRIG 169 (H) 10/16/2019    HDL 44 (L) 10/16/2019     (H) 10/16/2019     No results found for: HGBA1C  No results found for: INR, PROTIME    Most Recent Echo:  Results for orders placed during the hospital encounter of 04/28/22    Adult Transthoracic Echo Complete W/ Cont if Necessary Per Protocol    Interpretation Summary  · Estimated left ventricular EF was in agreement with the calculated left ventricular EF. Left ventricular ejection fraction appears to be 51 - 55%. Left ventricular systolic function is low normal.  · Left ventricular  diastolic function is consistent with (grade I) impaired relaxation.  · Estimated right ventricular systolic pressure from tricuspid regurgitation is normal (<35 mmHg).       Most Recent Stress Test:       Most Recent Cardiac Catheterization:   No results found for this or any previous visit.       NOTE: The following portions of the patient's note were reviewed, confirmed and/or updated this visit as appropriate: History of present illness/Interval history, physical examination, assessment & plan, allergies, current medications, past family history, past medical history, past social history, past surgical history and problem list.

## 2024-05-22 ENCOUNTER — OFFICE VISIT (OUTPATIENT)
Dept: CARDIOLOGY | Facility: CLINIC | Age: 74
End: 2024-05-22
Payer: MEDICARE

## 2024-05-22 VITALS
WEIGHT: 154 LBS | SYSTOLIC BLOOD PRESSURE: 124 MMHG | BODY MASS INDEX: 27.29 KG/M2 | OXYGEN SATURATION: 97 % | DIASTOLIC BLOOD PRESSURE: 72 MMHG | HEART RATE: 61 BPM | HEIGHT: 63 IN

## 2024-05-22 DIAGNOSIS — I10 ESSENTIAL HYPERTENSION: Primary | ICD-10-CM

## 2024-05-22 DIAGNOSIS — Z79.02 LONG TERM CURRENT USE OF ANTITHROMBOTICS/ANTIPLATELETS: ICD-10-CM

## 2024-05-22 DIAGNOSIS — E78.2 MIXED HYPERLIPIDEMIA: ICD-10-CM

## 2024-05-22 DIAGNOSIS — I42.8 NICM (NONISCHEMIC CARDIOMYOPATHY): ICD-10-CM

## 2024-05-22 RX ORDER — GABAPENTIN 300 MG/1
300 CAPSULE ORAL 2 TIMES DAILY
COMMUNITY
Start: 2024-04-30

## 2024-05-22 RX ORDER — LOTILANER OPHTHALMIC SOLUTION 2.5 MG/ML
SOLUTION/ DROPS OPHTHALMIC
COMMUNITY
Start: 2024-05-10

## 2024-05-22 RX ORDER — LIFITEGRAST 50 MG/ML
SOLUTION/ DROPS OPHTHALMIC
COMMUNITY
Start: 2024-03-11

## 2024-05-22 RX ORDER — MELOXICAM 7.5 MG/1
TABLET ORAL
COMMUNITY
Start: 2024-03-27 | End: 2024-05-22

## 2024-05-22 RX ORDER — ATORVASTATIN CALCIUM 40 MG/1
TABLET, FILM COATED ORAL
COMMUNITY
Start: 2024-02-23

## 2024-05-22 NOTE — PROGRESS NOTES
Cardiology Office Visit      Encounter Date:  05/22/2024    Patient ID:   Lakshmi Farrell is a 73 y.o. female.    Reason For Followup:  Nonischemic cardiomyopathy    Brief Clinical History:  Dear Dr. Blevins, Bud CHAN MD    I had the pleasure of seeing Lakshmi Farrell today. As you are well aware, this is a 73 y.o. female with no established history of ischemic heart disease.  She does have a history of nonischemic cardiomyopathy/heart failure with recovered ejection fraction.  She has additional history that includes hypertension, hyperlipidemia, and antiplatelet therapy.  She presents today for follow-up on the above conditions.    Interval History:  She denies any chest pain pressure heaviness or tightness.  She denies any shortness of breath.  She denies any PND orthopnea.  She denies any syncope or near syncope.  She reports feeling well from a cardiac perspective.    Is some she will recall, she was diagnosed with nonischemic cardiomyopathy several years ago.  Fortunately, her left ventricular systolic function has returned to normal.  Her most recent echocardiogram was performed in April 2022.  Low normal left ventricular ejection fraction of 50 to 55% was noted.  Grade 1 diastolic dysfunction.    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    05/22/2024        She reports she is doing well from a cardiac perspective. She has no chest pain  is reporting some nuropathy. She has an EMG later today. 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    Assessment & Plan    Impressions:  Heart failure with recovered ejection fraction secondary to nonischemic cardiomyopathy  Hypertension  Hypertensive cardiovascular disease  Hyperlipidemia  Antiplatelet therapy    Recommendations:  Continuation of her current cardiovascular regimen at the present time.     This includes antihypertensives, statin, and diuretic therapy.  Routine  "surveillance laboratory testing  Follow-up in 1 years time sooner should there be difficulties        Diagnoses and all orders for this visit:    1. Essential hypertension (Primary)  -     ECG 12 Lead    2. Mixed hyperlipidemia  -     ECG 12 Lead    3. NICM (nonischemic cardiomyopathy)  -     ECG 12 Lead    4. Long term current use of antithrombotics/antiplatelets  -     ECG 12 Lead            Objective:    Vitals:  Vitals:    05/22/24 1122   BP: 124/72   BP Location: Left arm   Patient Position: Sitting   Pulse: 61   SpO2: 97%   Weight: 69.9 kg (154 lb)   Height: 160 cm (63\")       Body mass index is 27.28 kg/m².      Physical Exam:    General: Alert, cooperative, no distress, appears stated age  Head:  Normocephalic, atraumatic, mucous membranes moist  Eyes:  Conjunctiva/corneas clear, EOM's intact     Neck:  Supple,  no bruit    Lungs: Clear to auscultation bilaterally, no wheezes rhonchi rales are noted  Chest wall: No tenderness  Heart::  Regular rate and rhythm, S1 and S2 normal, 1/6 holosystolic murmur.  No rub or gallop  Abdomen: Soft, non-tender, nondistended bowel sounds active  Extremities: No cyanosis, clubbing, or edema  Pulses: 2+ and symmetric all extremities  Skin:  No rashes or lesions  Neuro/psych: A&O x3. CN II through XII are grossly intact with appropriate affect      Allergies:  Allergies   Allergen Reactions    Sulindac Itching       Medication Review:     Current Outpatient Medications:     ALPRAZolam (XANAX) 0.5 MG tablet, As Needed., Disp: , Rfl:     amLODIPine (NORVASC) 5 MG tablet, Take 1 tablet by mouth Daily., Disp: 90 tablet, Rfl: 3    Aspirin Buf,CaCarb-MgCarb-MgO, 81 MG tablet, Take 81 mg by mouth Daily., Disp: , Rfl:     atorvastatin (LIPITOR) 40 MG tablet, , Disp: , Rfl:     carvedilol (COREG) 3.125 MG tablet, Take 1 tablet by mouth 2 (Two) Times a Day With Meals., Disp: 180 tablet, Rfl: 3    estradiol (VAGIFEM) 10 MCG tablet vaginal tablet, , Disp: , Rfl:     furosemide (LASIX) 40 " MG tablet, TAKE 1 TABLET DAILY, Disp: , Rfl:     gabapentin (NEURONTIN) 300 MG capsule, Take 1 capsule by mouth 2 (Two) Times a Day., Disp: , Rfl:     losartan (COZAAR) 100 MG tablet, Daily., Disp: , Rfl:     Multiple Vitamins-Minerals (CENTRUM ADULT PO), Take  by mouth., Disp: , Rfl:     potassium chloride (KLOR-CON) 20 MEQ CR tablet, Take 1 tablet by mouth Daily., Disp: 90 tablet, Rfl: 3    Xdemvy 0.25 % solution, INSTILL 1 DROP INTO EACH EYE EVERY 12 HOURS FOR 6 WEEKS, Disp: , Rfl:     Xiidra 5 % ophthalmic solution, , Disp: , Rfl:     Family History:  Family History   Problem Relation Age of Onset    Hypertension Mother     Heart disease Mother     Cancer Father     Stroke Maternal Grandmother     Heart attack Maternal Grandfather     Cancer Paternal Grandmother        Past Medical History:  Past Medical History:   Diagnosis Date    Cancer     Cardiomyopathy     CHF (congestive heart failure)     Hyperlipidemia     Hypertension     Neuropathy        Past Surgical History:  Past Surgical History:   Procedure Laterality Date    BREAST LUMPECTOMY Right 2001    LAPAROSCOPIC TUBAL LIGATION         Social History:  Social History     Socioeconomic History    Marital status:    Tobacco Use    Smoking status: Never     Passive exposure: Never    Smokeless tobacco: Never   Vaping Use    Vaping status: Never Used   Substance and Sexual Activity    Alcohol use: Not Currently    Drug use: Never    Sexual activity: Defer       Review of Systems:  The following systems were reviewed as they relate to the cardiovascular system: Constitutional, Eyes, ENT, Cardiovascular, Respiratory, Gastrointestinal, Integumentary, Neurological, Psychiatric, Hematologic, Endocrine, Musculoskeletal, and Genitourinary. The pertinent cardiovascular findings are reported above with all other cardiovascular points within those systems being negative.    Diagnostic Study Review:     Current Electrocardiogram:    ECG 12 Lead    Date/Time:  "5/22/2024 5:36 PM  Performed by: Guillaume Pulliam DO    Authorized by: Guillaume Pulliam DO  Comparison: not compared with previous ECG   Previous ECG: no previous ECG available  Comments: Normal sinus rhythm with a ventricular rate of 61 bpm.  Normal QT and QTc intervals.  Normal QRS axis.          Laboratory Data:  Lab Results   Component Value Date    BUN 21 (H) 10/13/2020    CREATININE 0.9 10/13/2020    BCR 24.0 (H) 10/13/2020    K 4.5 10/13/2020    CO2 31 (H) 10/13/2020    CALCIUM 9.5 10/13/2020    ALBUMIN 4.2 10/13/2020    LABIL2 1.1 10/13/2020    AST 19 10/13/2020    ALT 11 (L) 10/13/2020     Lab Results   Component Value Date    CALCIUM 9.5 10/13/2020     10/13/2020    K 4.5 10/13/2020    CO2 31 (H) 10/13/2020     10/13/2020    BUN 21 (H) 10/13/2020    CREATININE 0.9 10/13/2020    BCR 24.0 (H) 10/13/2020     Lab Results   Component Value Date    WBC 6.0 02/26/2019    HGB 12.6 02/26/2019    HCT 37.0 02/26/2019    MCV 94.9 02/26/2019     02/26/2019     Lab Results   Component Value Date    CHLPL 193 10/16/2019    TRIG 169 (H) 10/16/2019    HDL 44 (L) 10/16/2019     (H) 10/16/2019     No results found for: \"HGBA1C\"  No results found for: \"INR\", \"PROTIME\"    Most Recent Echo:  Results for orders placed during the hospital encounter of 04/28/22    Adult Transthoracic Echo Complete W/ Cont if Necessary Per Protocol    Interpretation Summary  · Estimated left ventricular EF was in agreement with the calculated left ventricular EF. Left ventricular ejection fraction appears to be 51 - 55%. Left ventricular systolic function is low normal.  · Left ventricular diastolic function is consistent with (grade I) impaired relaxation.  · Estimated right ventricular systolic pressure from tricuspid regurgitation is normal (<35 mmHg).       Most Recent Stress Test:       Most Recent Cardiac Catheterization:   No results found for this or any previous visit.       NOTE: " "    Today,05/22/2024 ,the following portions of the patient's note were reviewed, confirmed and/or updated as appropriate: History of present illness/Interval history, physical examination, assessment & plan, allergies, current medications, past family history, past medical history, past social history, past surgical history and problem list.    Labs pertinent to today's visit on 05/22/2024 (including but not limited to CBC, CMP, and lipid profiles) were requested from the patient's primary care provider/hospital/clinical laboratory.  If the labs were available for the visit, they were reviewed with the patient.  If they were not available, when received, special interest will be made to the labs pertinent to this visit.  The patient's most recent \"in-house\" labs are noted below and have been reviewed.  Outside labs pertinent to this visit are scanned into the record and have been reviewed.    Discussions held today, 05/22/2024,regarding procedures included risk, benefits, and options including but not limited to: Death, MI, stroke, pain, bleeding, infection, and possible need for vascular/thoracic/cardiothoracic surgery.    Copied information within this note was reviewed and is current as of 05/22/2024.    Assessment and plan noted herein represents the current plan of care as of 05/22/2024.    Significant resources from our office and staff are inherent in engaging this patient today,05/22/2024,and in a continuous and active collaborative plan of care related to their chronic cardiovascular conditions outlined herein.  The management of these conditions requires the direction of our service with specialized clinical knowledge, skills, and experience.  This collaborative care includes but is not limited to patient education, expectations and responsibilities, shared decision making around therapeutic goals, and shared commitments to achieve those goals.  "

## 2025-01-13 ENCOUNTER — PATIENT ROUNDING (BHMG ONLY) (OUTPATIENT)
Dept: URGENT CARE | Facility: CLINIC | Age: 75
End: 2025-01-13
Payer: MEDICARE

## 2025-01-13 NOTE — ED NOTES
Thank you for letting us care for you in your recent visit to our urgent care center. We would love to hear about your experience with us. Was this the first time you have visited our location?    We’re always looking for ways to make our patients’ experiences even better. Do you have any recommendations on ways we may improve?     I appreciate you taking the time to respond. Please be on the lookout for a survey about your recent visit from [employer] via text or email. We would greatly appreciate if you could fill that out and turn it back in. We want your voice to be heard and we value your feedback.   Thank you for choosing [facility] for your healthcare needs.

## 2025-07-17 NOTE — PROGRESS NOTES
Cardiology Office Visit      Encounter Date:  07/18/2025    Patient ID:   Lakshmi Farrell is a 74 y.o. female.    Reason For Followup:  Nonischemic cardiomyopathy    Brief Clinical History:  Dear Dr. Blevins, Bud CHAN MD    I had the pleasure of seeing Lakshmi Farrell today. As you are well aware, this is a 74 y.o. female with no established history of ischemic heart disease.  She does have a history of nonischemic cardiomyopathy/heart failure with recovered ejection fraction.  She has additional history that includes hypertension, hyperlipidemia, and antiplatelet therapy.  She presents today for follow-up on the above conditions.    Interval History:  She denies any chest pain pressure heaviness or tightness.  She denies any shortness of breath.  She denies any PND orthopnea.  She denies any syncope or near syncope.  She reports feeling well from a cardiac perspective.    Is some she will recall, she was diagnosed with nonischemic cardiomyopathy several years ago.  Fortunately, her left ventricular systolic function has returned to normal.  Her most recent echocardiogram was performed in April 2022.  Low normal left ventricular ejection fraction of 50 to 55% was noted.  Grade 1 diastolic dysfunction.    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    05/22/2024        She reports she is doing well from a cardiac perspective. She has no chest pain  is reporting some nuropathy. She has an EMG later today. 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    07/18/2025        Her blood pressure is elevated today. She has gone back to work for the fall. She was off in the winter because she fell and broke her wrist on ice.     She has not been taking her blood pressure. She will log values for the next 1-2 weeks and let us know what those values are.  Just medications accordingly.    Assessment & Plan    Impressions:  Heart failure  "with recovered ejection fraction secondary to nonischemic cardiomyopathy  Hypertension  Hypertensive cardiovascular disease  Hyperlipidemia  Antiplatelet therapy    Recommendations:  Continuation of her current cardiovascular regimen at the present time.     This includes antihypertensives, statin, and diuretic therapy.  Routine surveillance laboratory testing  Follow-up in 3 months time sooner should there be difficulties        Diagnoses and all orders for this visit:    1. Essential hypertension (Primary)    2. Mixed hyperlipidemia    3. NICM (nonischemic cardiomyopathy)    4. Long term current use of antithrombotics/antiplatelets    5. Heart failure with recovered ejection fraction (HFrecEF)              Objective:    Vitals:  Vitals:    07/18/25 0833   BP: 154/71   BP Location: Left arm   Patient Position: Sitting   Cuff Size: Adult   Pulse: (!) 47   SpO2: 97%   Weight: 71 kg (156 lb 9.6 oz)   Height: 157.5 cm (62\")         Body mass index is 28.64 kg/m².      Physical Exam:    General: Alert, cooperative, no distress, appears stated age  Head:  Normocephalic, atraumatic, mucous membranes moist  Eyes:  Conjunctiva/corneas clear, EOM's intact     Neck:  Supple,  no bruit    Lungs: Clear to auscultation bilaterally, no wheezes rhonchi rales are noted  Chest wall: No tenderness  Heart::  Regular rate and rhythm, S1 and S2 normal, 1/6 holosystolic murmur.  No rub or gallop  Abdomen: Soft, non-tender, nondistended bowel sounds active  Extremities: No cyanosis, clubbing, or edema  Pulses: 2+ and symmetric all extremities  Skin:  No rashes or lesions  Neuro/psych: A&O x3. CN II through XII are grossly intact with appropriate affect      Allergies:  Allergies   Allergen Reactions    Sulindac Itching       Medication Review:     Current Outpatient Medications:     ALPRAZolam (XANAX) 0.5 MG tablet, As Needed for Anxiety., Disp: , Rfl:     amLODIPine (NORVASC) 5 MG tablet, Take 1 tablet by mouth Daily., Disp: 90 tablet, " Rfl: 3    atorvastatin (LIPITOR) 40 MG tablet, , Disp: , Rfl:     carvedilol (COREG) 3.125 MG tablet, Take 1 tablet by mouth 2 (Two) Times a Day With Meals., Disp: 180 tablet, Rfl: 3    estradiol (VAGIFEM) 10 MCG tablet vaginal tablet, , Disp: , Rfl:     furosemide (LASIX) 40 MG tablet, TAKE 1 TABLET DAILY, Disp: , Rfl:     gabapentin (NEURONTIN) 300 MG capsule, Take 1 capsule by mouth 2 (Two) Times a Day., Disp: , Rfl:     losartan (COZAAR) 100 MG tablet, Daily., Disp: , Rfl:     Multiple Vitamins-Minerals (CENTRUM ADULT PO), Take  by mouth., Disp: , Rfl:     potassium chloride (KLOR-CON) 20 MEQ CR tablet, Take 1 tablet by mouth Daily., Disp: 90 tablet, Rfl: 3    Xiidra 5 % ophthalmic solution, , Disp: , Rfl:     Family History:  Family History   Problem Relation Age of Onset    Hypertension Mother     Heart disease Mother     Cancer Father     Stroke Maternal Grandmother     Heart attack Maternal Grandfather     Cancer Paternal Grandmother        Past Medical History:  Past Medical History:   Diagnosis Date    Cancer     Cardiomyopathy     CHF (congestive heart failure)     Hyperlipidemia     Hypertension     Neuropathy        Past Surgical History:  Past Surgical History:   Procedure Laterality Date    BREAST LUMPECTOMY Right 2001    LAPAROSCOPIC TUBAL LIGATION         Social History:  Social History     Socioeconomic History    Marital status:    Tobacco Use    Smoking status: Never     Passive exposure: Never    Smokeless tobacco: Never   Vaping Use    Vaping status: Never Used   Substance and Sexual Activity    Alcohol use: Not Currently    Drug use: Never    Sexual activity: Not Currently       Review of Systems:  The following systems were reviewed as they relate to the cardiovascular system: Constitutional, Eyes, ENT, Cardiovascular, Respiratory, Gastrointestinal, Integumentary, Neurological, Psychiatric, Hematologic, Endocrine, Musculoskeletal, and Genitourinary. The pertinent cardiovascular  "findings are reported above with all other cardiovascular points within those systems being negative.    Diagnostic Study Review:     Current Electrocardiogram:  Procedures sinus bradycardia with a ventricular rate of 45 bpm.  Prolonged QT and normal QTc intervals of 475 and 411 ms respectively.  Normal QRS axis.    Laboratory Data:  Lab Results   Component Value Date    BUN 21 (H) 10/13/2020    CREATININE 0.9 10/13/2020    BCR 24.0 (H) 10/13/2020    K 4.5 10/13/2020    CO2 31 (H) 10/13/2020    CALCIUM 9.5 10/13/2020    ALBUMIN 4.2 10/13/2020    LABIL2 1.1 10/13/2020    AST 19 10/13/2020    ALT 11 (L) 10/13/2020     Lab Results   Component Value Date    CALCIUM 9.5 10/13/2020     10/13/2020    K 4.5 10/13/2020    CO2 31 (H) 10/13/2020     10/13/2020    BUN 21 (H) 10/13/2020    CREATININE 0.9 10/13/2020    BCR 24.0 (H) 10/13/2020     Lab Results   Component Value Date    WBC 6.0 02/26/2019    HGB 12.6 02/26/2019    HCT 37.0 02/26/2019    MCV 94.9 02/26/2019     02/26/2019     Lab Results   Component Value Date    CHLPL 193 10/16/2019    TRIG 169 (H) 10/16/2019    HDL 44 (L) 10/16/2019     (H) 10/16/2019     No results found for: \"HGBA1C\"  No results found for: \"INR\", \"PROTIME\"    Most Recent Echo:  Results for orders placed during the hospital encounter of 04/28/22    Adult Transthoracic Echo Complete W/ Cont if Necessary Per Protocol 04/29/2022  2:11 PM    Interpretation Summary  · Estimated left ventricular EF was in agreement with the calculated left ventricular EF. Left ventricular ejection fraction appears to be 51 - 55%. Left ventricular systolic function is low normal.  · Left ventricular diastolic function is consistent with (grade I) impaired relaxation.  · Estimated right ventricular systolic pressure from tricuspid regurgitation is normal (<35 mmHg).       Most Recent Stress Test:       Most Recent Cardiac Catheterization:   No results found for this or any previous visit.   " "    NOTE:     Today,07/18/2025 ,the following portions of the patient's note were reviewed, confirmed and/or updated as appropriate: History of present illness/Interval history, physical examination, assessment & plan, allergies, current medications, past family history, past medical history, past social history, past surgical history and problem list.    Labs pertinent to today's visit on 07/18/2025 (including but not limited to CBC, CMP, and lipid profiles) were requested from the patient's primary care provider/hospital/clinical laboratory.  If the labs were available for the visit, they were reviewed with the patient.  If they were not available, when received, special interest will be made to the labs pertinent to this visit.  The patient's most recent \"in-house\" labs are noted below and have been reviewed.  Outside labs pertinent to this visit are scanned into the record and have been reviewed.    Discussions held today, 07/18/2025,regarding procedures included risk, benefits, and options including but not limited to: Death, MI, stroke, pain, bleeding, infection, and possible need for vascular/thoracic/cardiothoracic surgery.    Copied information within this note was reviewed and is current as of 07/18/2025.    Assessment and plan noted herein represents the current plan of care as of 07/18/2025.    Significant resources from our office and staff are inherent in engaging this patient today,07/18/2025,and in a continuous and active collaborative plan of care related to their chronic cardiovascular conditions outlined herein.  The management of these conditions requires the direction of our service with specialized clinical knowledge, skills, and experience.  This collaborative care includes but is not limited to patient education, expectations and responsibilities, shared decision making around therapeutic goals, and shared commitments to achieve those goals.  "

## 2025-07-18 ENCOUNTER — OFFICE VISIT (OUTPATIENT)
Dept: CARDIOLOGY | Facility: CLINIC | Age: 75
End: 2025-07-18
Payer: MEDICARE

## 2025-07-18 VITALS
WEIGHT: 156.6 LBS | HEIGHT: 62 IN | DIASTOLIC BLOOD PRESSURE: 71 MMHG | SYSTOLIC BLOOD PRESSURE: 154 MMHG | HEART RATE: 47 BPM | OXYGEN SATURATION: 97 % | BODY MASS INDEX: 28.82 KG/M2

## 2025-07-18 DIAGNOSIS — Z79.02 LONG TERM CURRENT USE OF ANTITHROMBOTICS/ANTIPLATELETS: ICD-10-CM

## 2025-07-18 DIAGNOSIS — I10 ESSENTIAL HYPERTENSION: Primary | ICD-10-CM

## 2025-07-18 DIAGNOSIS — I50.32 HEART FAILURE WITH RECOVERED EJECTION FRACTION (HFRECEF): ICD-10-CM

## 2025-07-18 DIAGNOSIS — I42.8 NICM (NONISCHEMIC CARDIOMYOPATHY): ICD-10-CM

## 2025-07-18 DIAGNOSIS — E78.2 MIXED HYPERLIPIDEMIA: ICD-10-CM

## 2025-07-18 NOTE — PATIENT INSTRUCTIONS
Monitor blood pressure and log values.  Call with values in 1-2 weeks  Follow-up 3 months  Get labs from PCP